# Patient Record
Sex: MALE | Race: WHITE
[De-identification: names, ages, dates, MRNs, and addresses within clinical notes are randomized per-mention and may not be internally consistent; named-entity substitution may affect disease eponyms.]

---

## 2017-01-07 ENCOUNTER — HOSPITAL ENCOUNTER (EMERGENCY)
Dept: HOSPITAL 10 - FTE | Age: 42
LOS: 1 days | Discharge: HOME | End: 2017-01-08
Payer: MEDICAID

## 2017-01-07 VITALS
WEIGHT: 157.63 LBS | BODY MASS INDEX: 24.74 KG/M2 | HEIGHT: 67 IN | HEIGHT: 67 IN | BODY MASS INDEX: 24.74 KG/M2 | WEIGHT: 157.63 LBS

## 2017-01-07 DIAGNOSIS — M54.2: Primary | ICD-10-CM

## 2017-01-07 DIAGNOSIS — M62.838: ICD-10-CM

## 2017-01-07 PROCEDURE — 99284 EMERGENCY DEPT VISIT MOD MDM: CPT

## 2017-01-08 VITALS
RESPIRATION RATE: 18 BRPM | SYSTOLIC BLOOD PRESSURE: 139 MMHG | HEART RATE: 74 BPM | TEMPERATURE: 98.7 F | DIASTOLIC BLOOD PRESSURE: 82 MMHG

## 2017-01-08 NOTE — ERD
ER Documentation


Chief Complaint


Date/Time


DATE: 1/8/17 


TIME: 00:04


Chief Complaint


neck pain x 3 days denies any recent injury or trauma





HPI


The patient is a 41-year-old male with 3 days of bilateral neck pain, right 

worse than left.  It started when he slept on it wrong and woke up.  He denies 

any injury, accident, trauma, or fall.  He has tried Advil at home with only 

moderate relief.  He denies any other symptoms or recent illness.





ROS


All systems reviewed and are negative except as per history of present illness.





Medications


Home Meds


Active Scripts


Cyclobenzaprine Hcl* (Cyclobenzaprine Hcl*) 10 Mg Tablet, 10 MG PO TID, #9 TAB


   Prov:TROY CROCKETT NP         1/8/17


Tramadol HCl (Tramadol HCl) 50 Mg Tablet, 50 MG PO Q6 Y for PAIN, #9 TAB


   Prov:TROY CROCKETT, ANDERS         1/8/17


Ibuprofen* (Motrin*) 600 Mg Tab, 600 MG PO Q6H Y for PAIN AND OR ELEVATED TEMP, 

#30 TAB


   Prov:TROY CROCKETT NP         1/8/17


Ciprofloxacin Hcl* (Ciprofloxacin Hcl*) 500 Mg Tablet, 500 MG PO BID for 21 Days

, TAB


   Prov:EDITA HOLDER PA-C         8/4/16


Acetaminophen* (Tylenol*) 325 Mg Tablet, 2 TAB PO Q6 Y for PAIN AND OR ELEVATED 

TEMP, #20 TAB


   Prov:MEHREEN GARRETT PA-C         2/17/16


Famotidine* (Pepcid*) 20 Mg Tablet, 20 MG PO BID for 4 Days, TAB


   Prov:MEHREEN GARRETT PA-C         2/17/16


Tramadol HCl (Tramadol HCl) 50 Mg Tab, 50 MG PO Q6, #20 TAB


   Prov:MICHAEL BRAR DO         10/4/15


Reported Medications


Docusate Sodium* (Doc-Q-Lace*) 100 Mg Capsule, 100 MG PO BID Y for CONSTIPATION

, CAP


   10/3/15


Hydrocodone Bit-Acetaminophen* (Norco*) 5-325 Mg Tab, 1 TAB PO Q6 Y for PAIN, 

TAB


   10/3/15





Allergies


Allergies:  


Coded Allergies:  


     No Known Allergy (Unverified , 8/4/16)





PMhx/Soc


History of Surgery:  Yes (inguinal hernia surgery)


Anesthesia Reaction:  No


Hx Neurological Disorder:  No


Hx Respiratory Disorders:  No


Hx Cardiac Disorders:  No


Hx Psychiatric Problems:  No


Hx Miscellaneous Medical Probl:  No


Hx Alcohol Use:  No


Hx Substance Use:  No


Hx Tobacco Use:  No


Smoking Status:  Never smoker





Physical Exam


Vitals





Vital Signs








  Date Time  Temp Pulse Resp B/P Pulse Ox O2 Delivery O2 Flow Rate FiO2


 


1/8/17 00:28 98.7 74 18 139/82 99 Room Air  


 


1/7/17 19:07 98.9 75 18 156/82 98   








Physical Exam


INITIAL VITAL SIGNS: Reviewed by me


GENERAL: Alert. Well developed and well nourished. No respiratory distress


HEAD: Head is normocephalic.  Atraumatic.


EYES: EOMI. PERRL. No scleral icterus. No conjunctival injection.


ENT: External ears, nose, and mouth normal. Nasal passages patent. Moist mucous 

membranes. 


NECK: + Muscular tightness at the SCMs bilaterally consistent with muscle 

spasm.  + SCMs are tender to palpation bilaterally.  No bony tenderness to 

palpation.  Full range of motion in flexion.  Range of motion limited secondary 

to pain in extension, rotation, and side bending.  No step-off.  No external 

signs of trauma.  Trachea midline. 


RESPIRATORY: No tachypnea. Clear to auscultation bilaterally. No wheezing, rales

, or rhonchi.


CV: Regular rate and rhythm. No murmurs, rubs, or gallops


ABDOMEN: Soft, non-distended, non-tender. No guarding. No rebound. No masses. 

Bowel sounds normal in all quadrants.


BACK: No CVA tenderness. Full ROM.


EXTREMITIES: Bilateral upper extremities with full range of motion in all 

joints.  Sensation intact to light touch to bilateral upper extremities.  

Strong hand grasps.  Radial pulses +2 bilaterally.  Strength 5/5 at shoulders, 

elbows, wrists, fingers.  No obvious deformity. No clubbing or cyanosis. No 

edema.


SKIN: Warm and dry. No diaphoresis. No obvious rashes or lesions.


NEUROLOGIC: Alert and oriented x 3. Appropriate. Face is symmetric. Speech is 

normal. Moves all extremities equally.





Procedures/MDM


Nursing Notes Reviewed


Previous Medical Records requested via [employer].











EMERGENCY DEPARTMENT COURSE / MEDICAL DECISION MAKING:








The patient comes to the ED secondary to bilateral neck pain, right greater 

than left, 3 days.





Differential diagnosis upon initial evaluation includes but is not limited to: 

Fracture, dislocation, nerve injury, vascular injury, muscle strain, sprain, 

muscle spasm, and others.





Given the patient's history of present illness, no trauma or injury, no bony 

tenderness to palpation, no signs of neurovascular injury, benign physical exam

, I have low suspicion for fracture, dislocation, nerve injury, vascular injury

, or any other serious cause of neck pain.





Final impression:


1.  Neck pain


2.  Muscle spasm





Based on patient's history of present illness and physical examination the 

decision was made to discharge. There is no evidence of life threatening 

injuries or illnesses at this time.





On re-examination, patient resting in no distress, stable vital signs, reports 

feeling safe for discharge with outpatient follow up with PMD in 1-2 days. 

Patient given return precautions.   





Prescription


Ibuprofen


Tramadol


Flexeril





Departure


Diagnosis:  


 Primary Impression:  


 Neck pain


 Additional Impression:  


 Muscle spasms of neck


Condition:  Stable











TROY CROCKETT NP Jan 8, 2017 00:10

## 2018-08-22 ENCOUNTER — HOSPITAL ENCOUNTER (EMERGENCY)
Age: 43
Discharge: HOME | End: 2018-08-22

## 2018-08-22 ENCOUNTER — HOSPITAL ENCOUNTER (EMERGENCY)
Dept: HOSPITAL 91 - E/R | Age: 43
Discharge: HOME | End: 2018-08-22
Payer: MEDICAID

## 2018-08-22 DIAGNOSIS — R07.89: Primary | ICD-10-CM

## 2018-08-22 PROCEDURE — 93005 ELECTROCARDIOGRAM TRACING: CPT

## 2018-08-22 PROCEDURE — 71045 X-RAY EXAM CHEST 1 VIEW: CPT

## 2018-08-22 PROCEDURE — 99284 EMERGENCY DEPT VISIT MOD MDM: CPT

## 2018-08-22 RX ADMIN — IBUPROFEN 1 MG: 800 TABLET, FILM COATED ORAL at 15:02
